# Patient Record
Sex: FEMALE | Race: BLACK OR AFRICAN AMERICAN
[De-identification: names, ages, dates, MRNs, and addresses within clinical notes are randomized per-mention and may not be internally consistent; named-entity substitution may affect disease eponyms.]

---

## 2020-08-17 ENCOUNTER — HOSPITAL ENCOUNTER (EMERGENCY)
Dept: HOSPITAL 72 - EMR | Age: 30
Discharge: HOME | End: 2020-08-17
Payer: MEDICAID

## 2020-08-17 VITALS — HEIGHT: 65 IN | BODY MASS INDEX: 27.49 KG/M2 | WEIGHT: 165 LBS

## 2020-08-17 VITALS — SYSTOLIC BLOOD PRESSURE: 118 MMHG | DIASTOLIC BLOOD PRESSURE: 70 MMHG

## 2020-08-17 VITALS — DIASTOLIC BLOOD PRESSURE: 74 MMHG | SYSTOLIC BLOOD PRESSURE: 120 MMHG

## 2020-08-17 DIAGNOSIS — D25.9: Primary | ICD-10-CM

## 2020-08-17 DIAGNOSIS — N83.202: ICD-10-CM

## 2020-08-17 LAB
APPEARANCE UR: CLEAR
APTT PPP: YELLOW S
GLUCOSE UR STRIP-MCNC: NEGATIVE MG/DL
KETONES UR QL STRIP: NEGATIVE
LEUKOCYTE ESTERASE UR QL STRIP: NEGATIVE
NITRITE UR QL STRIP: NEGATIVE
PH UR STRIP: 6 [PH] (ref 4.5–8)
PROT UR QL STRIP: NEGATIVE
SP GR UR STRIP: 1.01 (ref 1–1.03)
UROBILINOGEN UR-MCNC: NORMAL MG/DL (ref 0–1)

## 2020-08-17 PROCEDURE — 81003 URINALYSIS AUTO W/O SCOPE: CPT

## 2020-08-17 PROCEDURE — 76830 TRANSVAGINAL US NON-OB: CPT

## 2020-08-17 PROCEDURE — 76856 US EXAM PELVIC COMPLETE: CPT

## 2020-08-17 PROCEDURE — 81025 URINE PREGNANCY TEST: CPT

## 2020-08-17 PROCEDURE — 99284 EMERGENCY DEPT VISIT MOD MDM: CPT

## 2020-08-17 NOTE — EMERGENCY ROOM REPORT
History of Present Illness


General


Chief Complaint:  Abdominal Pain


Source:  Patient





Present Illness


HPI


29 YO Female presents to the ED c/o increased pelvic pain x 1 month. PT. 

reports hx of fibroid. Pt. denies taking any medications or being prescribed 

anything. Pt. reports hx of heavy painful periods which previously were treated 

with oral birth control. Pt. reports d/c of birth control x 5 months as she is 

family planning. Pt. has never been pregnant in the past. She denies suspicion 

or hx of STD. Pt. denies vaginal discharge or bleeding. Pt. denies nausea or 

vomiting. She denies fevers or chills. She denies constipation or diarrhea. 

Denies dysuria, frequency or urgency. Pt. reports pain only after intercourse 

on occasion but not during. PT. denies genital lesions or rashes. She reports 

not having a PCP.  She denies acute onset of her pain. She reports hx of cysts 

as well. She reports pain is more in the right adnexal area today. She reports 

pain exacerbated after exercise or prolonged walking.


Allergies:  


Coded Allergies:  


     No Known Allergies (Unverified , 8/17/20)





COVID-19 Screening


Contact w/high risk pt:  No


Experienced COVID-19 symptoms?:  No


COVID-19 Testing performed PTA:  No





Patient History


Past Medical History:  see triage record


Past Surgical History:  none


Pertinent Family History:  none


Last Menstrual Period:  07/29/20


Pregnant Now:  No


Reviewed Nursing Documentation:  PMH: Agreed; PSxH: Agreed





Review of Systems


All Other Systems:  negative except mentioned in HPI





Physical Exam





Vital Signs








  Date Time  Temp Pulse Resp B/P (MAP) Pulse Ox O2 Delivery O2 Flow Rate FiO2


 


8/17/20 15:16 98.4 58 18 120/74 (89) 96 Room Air  








Sp02 EP Interpretation:  reviewed, normal


General Appearance:  no apparent distress, alert, GCS 15, non-toxic


Head:  normocephalic, atraumatic


Eyes:  bilateral eye normal inspection, bilateral eye PERRL


ENT:  hearing grossly normal, normal voice


Neck:  full range of motion


Respiratory:  lungs clear, normal breath sounds, speaking full sentences


Cardiovascular #1:  regular rate, rhythm


Gastrointestinal:  normal bowel sounds, non tender, soft, non-distended, no 

guarding, no hernia


Rectal:  deferred


Genitourinary:  normal inspection, no CVA tenderness, other - TTP acros pubic 

area and on the right adnexa


Musculoskeletal:  back normal, normal range of motion, gait/station normal, non-

tender


Neurologic:  alert, motor strength/tone normal, oriented x3, sensory intact, 

responsive, speech normal


Psychiatric:  judgement/insight normal


Lymphatic:  no adenopathy





Medical Decision Making


PA Attestation


Dr. Benites Is my supervising Physician whom patient management has been 

discussed with.


Diagnostic Impression:  


 Primary Impression:  


 Fibroid uterus


 Qualified Codes:  D25.9 - Leiomyoma of uterus, unspecified


 Additional Impression:  


 Left ovarian cyst


ER Course


29 YO Female presents to the ED c/o increased pelvic pain x 1 month. PT. 

reports hx of fibroid. Pt. denies taking any medications or being prescribed 

anything. Pt. reports hx of heavy painful periods which previously were treated 

with oral birth control. Pt. reports d/c of birth control x 5 months as she is 

family planning. Pt. has never been pregnant in the past. She denies suspicion 

or hx of STD. Pt. denies vaginal discharge or bleeding. Pt. denies nausea or 

vomiting. She denies fevers or chills. She denies constipation or diarrhea. 

Denies dysuria, frequency or urgency. Pt. reports pain only after intercourse 

on occasion but not during. PT. denies genital lesions or rashes. She reports 

not having a PCP.  She denies acute onset of her pain. She reports hx of cysts 

as well. She reports pain is more in the right adnexal area today. She reports 

pain exacerbated after exercise or prolonged walking. LMP was 7/ 29.





Ddx considered but are not limited to Diverticulitis, acute appy,  ovarian 

torsion,  ectopic pregnancy , PID tubo-ovarian abscess, ovarian cyst.





Vital signs: are WNL, pt. is afebrile





H&PE are most consistent with  possible fibroids vs ovarian cyst, however due 

to presentation will r/o torsion, ectopic,  and pregnancy





ORDERS: 


-UA: Unremarkable


-URINE HCG: negative


-Pelvic US Complete:   6.1 cm fibroid in addition to multiple hemorrhagic cyst 

in the left ovaries.  Normal color Doppler to the bilateral ovaries.  No free 

fluid in the cul-de-sac.  Uterus is enlarged.  In comparison with previous 

ultrasound the fibroid has been reported as decreased in size.








ED INTERVENTIONS: 


- None required at this time. 





Offered pt. NSAIDs, pt. is trying to become pregnant and wants to avoid 

medications. 





DISCHARGE: At this time pt. is stable for d/c to home. Will provide printed 

patient care instructions, and any necessary prescriptions. Care plan and 

follow up instructions have been discussed with the patient prior to discharge.





Labs








Test


  8/17/20


15:30


 


Urine Color Yellow 


 


Urine Appearance Clear 


 


Urine pH 6.0 (4.5-8.0) 


 


Urine Specific Gravity


  1.015


(1.005-1.035)


 


Urine Protein


  Negative


(NEGATIVE)


 


Urine Glucose (UA)


  Negative


(NEGATIVE)


 


Urine Ketones


  Negative


(NEGATIVE)


 


Urine Blood


  Negative


(NEGATIVE)


 


Urine Nitrite


  Negative


(NEGATIVE)


 


Urine Bilirubin


  Negative


(NEGATIVE)


 


Urine Urobilinogen


  Normal MG/DL


(0.0-1.0)


 


Urine Leukocyte Esterase


  Negative


(NEGATIVE)


 


Urine HCG, Qualitative


  Negative


(NEGATIVE)








CT/MRI/US Diagnostic Results


CT/MRI/US Diagnostic Results :  


   Imaging Test Ordered:  Pelvic Ultrasound


   Impression


" Normal color doppler flow to bilateral ovaries. 


Impression: Enlarged fibroid uterus


 Multiple hemorrhagic left ovarian cysts" --- per official radiology report- 

Please see report for specific details.





Last Vital Signs








  Date Time  Temp Pulse Resp B/P (MAP) Pulse Ox O2 Delivery O2 Flow Rate FiO2


 


8/17/20 15:28  58 18   Room Air  


 


8/17/20 15:28 98.4   120/74 96   








Disposition:  HOME, SELF-CARE


Condition:  Stable


Referrals:  


[facility]





[facility]





[facility]


Patient Instructions:  Ovarian Cyst, Easy-to-Read, Uterine Fibroids, Easy-to-

Read





Additional Instructions:  


Take OTC NSAID medications as directed. 





 ** Follow up with a GYNECOLOGICAL SPECIALIST within 3-5 days, even if your 

symptoms have resolved. ** 





Return sooner to ED if new symptoms occur, or current symptoms become worse. 











- Please note that this Emergency Department Report was dictated using [employer] technology software, occasionally this can lead to 

erroneous entry secondary to interpretation by the dictation equipment.











Chela Villeda Aug 17, 2020 15:49

## 2020-08-17 NOTE — DIAGNOSTIC IMAGING REPORT
Indication: Pelvic pain, Negative urine pregnancy test

 

Technique: Transabdominal and transvaginal images.

 

Comparison: none

 

Findings: Uterus measures 11.8 cm in length by 9 cm AP. Within the fundus, there

multiple fibroids, including a large fibroid measuring 6.1 cm. Endometrium measures

11 mm thick., The left ovary measures 4.4 cm in length, contains a 2.9 cm hemorrhagic

versus as well as a second 1.7 cm hemorrhagic cyst. The right ovary measures 3.5 cm

in length. Both ovaries demonstrate normal flow on Doppler. No free cul-de-sac fluid.

 

Impression: Enlarged fibroid uterus

 

Multiple hemorrhagic left ovarian cysts